# Patient Record
Sex: FEMALE | Race: WHITE | NOT HISPANIC OR LATINO
[De-identification: names, ages, dates, MRNs, and addresses within clinical notes are randomized per-mention and may not be internally consistent; named-entity substitution may affect disease eponyms.]

---

## 2017-08-10 ENCOUNTER — RESULT REVIEW (OUTPATIENT)
Age: 47
End: 2017-08-10

## 2018-08-14 ENCOUNTER — RESULT REVIEW (OUTPATIENT)
Age: 48
End: 2018-08-14

## 2019-10-17 ENCOUNTER — RESULT REVIEW (OUTPATIENT)
Age: 49
End: 2019-10-17

## 2020-09-24 ENCOUNTER — APPOINTMENT (OUTPATIENT)
Dept: OTOLARYNGOLOGY | Facility: CLINIC | Age: 50
End: 2020-09-24
Payer: COMMERCIAL

## 2020-09-24 ENCOUNTER — APPOINTMENT (OUTPATIENT)
Dept: OTOLARYNGOLOGY | Facility: CLINIC | Age: 50
End: 2020-09-24

## 2020-09-24 ENCOUNTER — TRANSCRIPTION ENCOUNTER (OUTPATIENT)
Age: 50
End: 2020-09-24

## 2020-09-24 DIAGNOSIS — J34.3 HYPERTROPHY OF NASAL TURBINATES: ICD-10-CM

## 2020-09-24 DIAGNOSIS — J30.9 ALLERGIC RHINITIS, UNSPECIFIED: ICD-10-CM

## 2020-09-24 DIAGNOSIS — J34.2 DEVIATED NASAL SEPTUM: ICD-10-CM

## 2020-09-24 DIAGNOSIS — J32.0 CHRONIC MAXILLARY SINUSITIS: ICD-10-CM

## 2020-09-24 DIAGNOSIS — K04.7 PERIAPICAL ABSCESS W/OUT SINUS: ICD-10-CM

## 2020-09-24 DIAGNOSIS — G50.1 ATYPICAL FACIAL PAIN: ICD-10-CM

## 2020-09-24 DIAGNOSIS — Z88.9 ALLERGY STATUS TO UNSPECIFIED DRUGS, MEDICAMENTS AND BIOLOGICAL SUBSTANCES: ICD-10-CM

## 2020-09-24 PROCEDURE — 31231 NASAL ENDOSCOPY DX: CPT

## 2020-09-24 PROCEDURE — 99243 OFF/OP CNSLTJ NEW/EST LOW 30: CPT | Mod: 25

## 2020-09-24 RX ORDER — AMOXICILLIN AND CLAVULANATE POTASSIUM 875; 125 MG/1; MG/1
875-125 TABLET, COATED ORAL TWICE DAILY
Qty: 28 | Refills: 1 | Status: COMPLETED | COMMUNITY
Start: 2020-09-24 | End: 2020-10-22

## 2020-09-24 RX ORDER — PREDNISONE 10 MG/1
10 TABLET ORAL TWICE DAILY
Qty: 20 | Refills: 1 | Status: ACTIVE | COMMUNITY
Start: 2020-09-24 | End: 1900-01-01

## 2020-09-24 NOTE — REASON FOR VISIT
[Initial Consultation] : an initial consultation for [Sinusitis] : sinusitis [FreeTextEntry2] : sinuses

## 2020-09-24 NOTE — HISTORY OF PRESENT ILLNESS
[de-identified] : 9 mo ago foul odor nose rt side\par rx amox w primary than dental exam and filling\par fu Dr. Weldon re rt upper molar\par had root canal 3 procedures past 4 weeks\par now nose somewhat better thick green nasal dc\par neg prior hx sinusitis, rx antibiotic rx x 10 d ?augmentin\par dental panorex reported showing mucosal thickening\par mild seasonal allergies

## 2020-09-24 NOTE — ASSESSMENT
[FreeTextEntry1] : persistent rt maxillary sinusitis sp rx rt upper second molar tooth #2\par abscess\par deviated septum rt nasal polyp\par rec longer course antibiotic-augmentin x 2 weeks\par pred 10 #20\par consider ct sinuses if not resolved\par may need septoplasty and  rt maxillary antrostomy

## 2020-09-24 NOTE — CONSULT LETTER
[Consult Letter:] : I had the pleasure of evaluating your patient, [unfilled]. [Please see my note below.] : Please see my note below. [Sincerely,] : Sincerely, [FreeTextEntry2] : Alex Weldon DDS [FreeTextEntry1] : Dear Dr. LICHA MACIAS,\par \par Thank you for your kind referral. Please refer to my enclosed office notes for ARACELI REINOSO . If there are any questions free to contact me.\par  [FreeTextEntry3] : Neto Onofre MD, FACS\par

## 2020-09-24 NOTE — REVIEW OF SYSTEMS
[Post Nasal Drip] : post nasal drip [Lightheadedness] : lightheadedness [Nasal Congestion] : nasal congestion [Recurrent Sinus Infections] : recurrent sinus infections [Problem Snoring] : problem snoring [Sense Of Smell Problem] : sense of smell problem [Discolored Nasal Discharge] : discolored nasal discharge [Negative] : Heme/Lymph

## 2020-09-24 NOTE — PROCEDURE
[FreeTextEntry6] : Indication for procedure:  Unable to adequately examine mid and posterior nasal cavity with anterior rhinoscopy\par Sinus endoscope # 15\par Nasal septum exam shows septal deviation to the rt 4 plus at valve\par The inferior nasal turbinates are moderately hypertrophic in size bilaterally.\par The sinus endoscope was introduced into the right nares\par exam right middle meatus reveals moderate polyp  at level  rt middle turbinate.  The right middle turbinate is WNL.\par The scope was advanced and the sphenoethmoid region was inspected.\par The superior meatus, superior turbinate and nasal vault are unremarkable.  The nasopharynx is unremarkable without inflammation or mass.\par The sinus endoscope was introduced into the left nares and\par exam left middle meatus reveals no mucopus or inflammation.  The left middle turbinate is WNL.\par The scope was advanced and the sphenoethmoid region was inspected.\par The left superior meatus and nasal vault are unremarkable.\par

## 2022-04-28 ENCOUNTER — APPOINTMENT (OUTPATIENT)
Dept: OBGYN | Facility: CLINIC | Age: 52
End: 2022-04-28
Payer: COMMERCIAL

## 2022-04-28 ENCOUNTER — RESULT REVIEW (OUTPATIENT)
Age: 52
End: 2022-04-28

## 2022-04-28 PROCEDURE — 99396 PREV VISIT EST AGE 40-64: CPT | Mod: 25

## 2022-04-28 PROCEDURE — 81002 URINALYSIS NONAUTO W/O SCOPE: CPT

## 2022-04-28 PROCEDURE — 76830 TRANSVAGINAL US NON-OB: CPT

## 2022-04-28 PROCEDURE — 82270 OCCULT BLOOD FECES: CPT

## 2022-04-28 PROCEDURE — 76856 US EXAM PELVIC COMPLETE: CPT | Mod: 59

## 2023-06-13 ENCOUNTER — APPOINTMENT (OUTPATIENT)
Dept: OBGYN | Facility: CLINIC | Age: 53
End: 2023-06-13
Payer: COMMERCIAL

## 2023-06-13 PROCEDURE — 76830 TRANSVAGINAL US NON-OB: CPT

## 2023-06-13 PROCEDURE — 82270 OCCULT BLOOD FECES: CPT

## 2023-06-13 PROCEDURE — 99396 PREV VISIT EST AGE 40-64: CPT | Mod: 25

## 2023-06-13 PROCEDURE — 81002 URINALYSIS NONAUTO W/O SCOPE: CPT

## 2023-12-12 ENCOUNTER — APPOINTMENT (OUTPATIENT)
Dept: OBGYN | Facility: CLINIC | Age: 53
End: 2023-12-12
Payer: COMMERCIAL

## 2023-12-12 PROCEDURE — 99212 OFFICE O/P EST SF 10 MIN: CPT

## 2023-12-12 PROCEDURE — 76830 TRANSVAGINAL US NON-OB: CPT

## 2024-07-02 ENCOUNTER — APPOINTMENT (OUTPATIENT)
Dept: OBGYN | Facility: CLINIC | Age: 54
End: 2024-07-02

## 2024-07-02 PROCEDURE — 81002 URINALYSIS NONAUTO W/O SCOPE: CPT

## 2024-07-02 PROCEDURE — 99396 PREV VISIT EST AGE 40-64: CPT | Mod: 25

## 2024-07-02 PROCEDURE — 99459 PELVIC EXAMINATION: CPT

## 2024-07-02 PROCEDURE — 82270 OCCULT BLOOD FECES: CPT
